# Patient Record
Sex: MALE | Race: WHITE | Employment: UNEMPLOYED | URBAN - METROPOLITAN AREA
[De-identification: names, ages, dates, MRNs, and addresses within clinical notes are randomized per-mention and may not be internally consistent; named-entity substitution may affect disease eponyms.]

---

## 2018-01-30 ENCOUNTER — APPOINTMENT (OUTPATIENT)
Dept: GENERAL RADIOLOGY | Age: 18
End: 2018-01-30
Attending: PHYSICIAN ASSISTANT
Payer: SELF-PAY

## 2018-01-30 ENCOUNTER — APPOINTMENT (OUTPATIENT)
Dept: CT IMAGING | Age: 18
End: 2018-01-30
Attending: EMERGENCY MEDICINE
Payer: SELF-PAY

## 2018-01-30 ENCOUNTER — HOSPITAL ENCOUNTER (OUTPATIENT)
Age: 18
Setting detail: OBSERVATION
Discharge: OTHER INSTITUTION | End: 2018-01-31
Attending: EMERGENCY MEDICINE | Admitting: INTERNAL MEDICINE
Payer: SELF-PAY

## 2018-01-30 DIAGNOSIS — S11.21XA TEAR OF ESOPHAGUS, INITIAL ENCOUNTER: ICD-10-CM

## 2018-01-30 DIAGNOSIS — J98.2 PNEUMOMEDIASTINUM (HCC): Primary | ICD-10-CM

## 2018-01-30 PROBLEM — F31.9 BIPOLAR 1 DISORDER (HCC): Status: ACTIVE | Noted: 2018-01-30

## 2018-01-30 PROBLEM — F95.2 TOURETTE'S DISEASE: Status: ACTIVE | Noted: 2018-01-30

## 2018-01-30 LAB
ALBUMIN SERPL-MCNC: 4.5 G/DL (ref 3.4–5)
ALBUMIN/GLOB SERPL: 1.3 {RATIO} (ref 0.8–1.7)
ALP SERPL-CCNC: 76 U/L (ref 45–117)
ALT SERPL-CCNC: 28 U/L (ref 16–61)
AMPHET UR QL SCN: POSITIVE
ANION GAP SERPL CALC-SCNC: 12 MMOL/L (ref 3–18)
AST SERPL-CCNC: 19 U/L (ref 15–37)
ATRIAL RATE: 87 BPM
BARBITURATES UR QL SCN: NEGATIVE
BASOPHILS # BLD: 0.1 K/UL (ref 0–0.06)
BASOPHILS NFR BLD: 1 % (ref 0–2)
BENZODIAZ UR QL: NEGATIVE
BILIRUB SERPL-MCNC: 1.5 MG/DL (ref 0.2–1)
BUN SERPL-MCNC: 20 MG/DL (ref 7–18)
BUN/CREAT SERPL: 19 (ref 12–20)
CALCIUM SERPL-MCNC: 9.2 MG/DL (ref 8.5–10.1)
CALCULATED P AXIS, ECG09: 66 DEGREES
CALCULATED R AXIS, ECG10: 92 DEGREES
CALCULATED T AXIS, ECG11: 65 DEGREES
CANNABINOIDS UR QL SCN: NEGATIVE
CHLORIDE SERPL-SCNC: 105 MMOL/L (ref 100–108)
CK MB CFR SERPL CALC: 0.6 % (ref 0–4)
CK MB CFR SERPL CALC: 0.6 % (ref 0–4)
CK MB SERPL-MCNC: 1.1 NG/ML (ref 5–25)
CK MB SERPL-MCNC: 1.5 NG/ML (ref 5–25)
CK SERPL-CCNC: 190 U/L (ref 39–308)
CK SERPL-CCNC: 246 U/L (ref 39–308)
CO2 SERPL-SCNC: 22 MMOL/L (ref 21–32)
COCAINE UR QL SCN: NEGATIVE
CREAT SERPL-MCNC: 1.06 MG/DL (ref 0.6–1.3)
DIAGNOSIS, 93000: NORMAL
DIFFERENTIAL METHOD BLD: ABNORMAL
EOSINOPHIL # BLD: 0.1 K/UL (ref 0–0.4)
EOSINOPHIL NFR BLD: 1 % (ref 0–5)
ERYTHROCYTE [DISTWIDTH] IN BLOOD BY AUTOMATED COUNT: 12.1 % (ref 11.6–14.5)
ETHANOL SERPL-MCNC: <3 MG/DL (ref 0–3)
GLOBULIN SER CALC-MCNC: 3.5 G/DL (ref 2–4)
GLUCOSE SERPL-MCNC: 77 MG/DL (ref 74–99)
HCT VFR BLD AUTO: 44.2 % (ref 36–48)
HDSCOM,HDSCOM: ABNORMAL
HGB BLD-MCNC: 16 G/DL (ref 13–16)
LYMPHOCYTES # BLD: 2.7 K/UL (ref 0.9–3.6)
LYMPHOCYTES NFR BLD: 32 % (ref 21–52)
MCH RBC QN AUTO: 30.8 PG (ref 24–34)
MCHC RBC AUTO-ENTMCNC: 36.7 G/DL (ref 31–37)
MCV RBC AUTO: 83.8 FL (ref 74–97)
METHADONE UR QL: NEGATIVE
MONOCYTES # BLD: 0.9 K/UL (ref 0.05–1.2)
MONOCYTES NFR BLD: 10 % (ref 3–10)
NEUTS SEG # BLD: 4.7 K/UL (ref 1.8–8)
NEUTS SEG NFR BLD: 56 % (ref 40–73)
OPIATES UR QL: NEGATIVE
P-R INTERVAL, ECG05: 126 MS
PCP UR QL: NEGATIVE
PLATELET # BLD AUTO: 297 K/UL (ref 135–420)
PMV BLD AUTO: 9.4 FL (ref 9.2–11.8)
POTASSIUM SERPL-SCNC: 3.6 MMOL/L (ref 3.5–5.5)
PROT SERPL-MCNC: 8 G/DL (ref 6.4–8.2)
Q-T INTERVAL, ECG07: 374 MS
QRS DURATION, ECG06: 96 MS
QTC CALCULATION (BEZET), ECG08: 450 MS
RBC # BLD AUTO: 5.2 M/UL (ref 4.7–5.5)
SODIUM SERPL-SCNC: 139 MMOL/L (ref 136–145)
TROPONIN I SERPL-MCNC: <0.02 NG/ML (ref 0–0.04)
TROPONIN I SERPL-MCNC: <0.02 NG/ML (ref 0–0.04)
VENTRICULAR RATE, ECG03: 87 BPM
WBC # BLD AUTO: 8.3 K/UL (ref 4.6–13.2)

## 2018-01-30 PROCEDURE — 74011250637 HC RX REV CODE- 250/637: Performed by: EMERGENCY MEDICINE

## 2018-01-30 PROCEDURE — 74011250636 HC RX REV CODE- 250/636: Performed by: EMERGENCY MEDICINE

## 2018-01-30 PROCEDURE — 93005 ELECTROCARDIOGRAM TRACING: CPT

## 2018-01-30 PROCEDURE — 99285 EMERGENCY DEPT VISIT HI MDM: CPT

## 2018-01-30 PROCEDURE — 96374 THER/PROPH/DIAG INJ IV PUSH: CPT

## 2018-01-30 PROCEDURE — 99218 HC RM OBSERVATION: CPT

## 2018-01-30 PROCEDURE — 80047 BASIC METABLC PNL IONIZED CA: CPT

## 2018-01-30 PROCEDURE — 80307 DRUG TEST PRSMV CHEM ANLYZR: CPT

## 2018-01-30 PROCEDURE — 74011636320 HC RX REV CODE- 636/320: Performed by: EMERGENCY MEDICINE

## 2018-01-30 PROCEDURE — 71046 X-RAY EXAM CHEST 2 VIEWS: CPT

## 2018-01-30 PROCEDURE — 96375 TX/PRO/DX INJ NEW DRUG ADDON: CPT

## 2018-01-30 PROCEDURE — 71250 CT THORAX DX C-: CPT

## 2018-01-30 PROCEDURE — 74011250636 HC RX REV CODE- 250/636: Performed by: PHYSICIAN ASSISTANT

## 2018-01-30 PROCEDURE — 80053 COMPREHEN METABOLIC PANEL: CPT

## 2018-01-30 PROCEDURE — 71275 CT ANGIOGRAPHY CHEST: CPT

## 2018-01-30 PROCEDURE — 82550 ASSAY OF CK (CPK): CPT

## 2018-01-30 PROCEDURE — 96361 HYDRATE IV INFUSION ADD-ON: CPT

## 2018-01-30 PROCEDURE — 85025 COMPLETE CBC W/AUTO DIFF WBC: CPT

## 2018-01-30 RX ORDER — KETOROLAC TROMETHAMINE 30 MG/ML
30 INJECTION, SOLUTION INTRAMUSCULAR; INTRAVENOUS
Status: COMPLETED | OUTPATIENT
Start: 2018-01-30 | End: 2018-01-30

## 2018-01-30 RX ORDER — ASPIRIN 325 MG
325 TABLET ORAL
Status: COMPLETED | OUTPATIENT
Start: 2018-01-30 | End: 2018-01-30

## 2018-01-30 RX ORDER — LORAZEPAM 2 MG/ML
0.5 INJECTION INTRAMUSCULAR
Status: COMPLETED | OUTPATIENT
Start: 2018-01-30 | End: 2018-01-30

## 2018-01-30 RX ADMIN — LORAZEPAM 0.5 MG: 2 INJECTION INTRAMUSCULAR; INTRAVENOUS at 15:18

## 2018-01-30 RX ADMIN — KETOROLAC TROMETHAMINE 30 MG: 30 INJECTION, SOLUTION INTRAMUSCULAR at 15:18

## 2018-01-30 RX ADMIN — SODIUM CHLORIDE 1000 ML: 900 INJECTION, SOLUTION INTRAVENOUS at 15:18

## 2018-01-30 RX ADMIN — IOPAMIDOL 80 ML: 755 INJECTION, SOLUTION INTRAVENOUS at 15:55

## 2018-01-30 RX ADMIN — DIATRIZOATE MEGLUMINE AND DIATRIZOATE SODIUM 30 ML: 660; 100 LIQUID ORAL; RECTAL at 18:46

## 2018-01-30 RX ADMIN — ASPIRIN 325 MG ORAL TABLET 325 MG: 325 PILL ORAL at 15:18

## 2018-01-30 NOTE — ED NOTES
Patient c/o withdrawal sx from methamphetamines  Patient's last usage was yesterday. Patient has been using for  one month.  Blood drawn and sent to the lab call bell within reach

## 2018-01-30 NOTE — ED NOTES
I performed a brief evaluation, including history and physical, of the patient here in triage and I have determined that pt will need further treatment and evaluation from the main side ER physician. I have placed initial orders to help in expediting patients care.      January 30, 2018 at 2:05 PM - ROCHELLE Gilliam-DEBI      +meth use, last time yesterday, now feeling hot, \"jittery\", with CP, SOB, palpitations

## 2018-01-30 NOTE — ED PROVIDER NOTES
EMERGENCY DEPARTMENT HISTORY AND PHYSICAL EXAM    2:26 PM      Date: 1/30/2018  Patient Name: Butch Aponte    History of Presenting Illness     Chief Complaint   Patient presents with    Drug Problem    Chest Pain    Shortness of Breath         History Provided By: Patient    Chief Complaint: CP  Duration:  1 Day  Timing:  Constant  Location: Central chest radiating to back  Quality: Pressure  Severity:   Modifying Factors: 0.2g meth smoked last night  Associated Symptoms: SOB, anxious, palpitations, lose of balance, confusion, tingling in fingers and arms. Denies abdominal pain. Additional History (Context): Butch Aponte is a 21 y.o. male with asthma and psychiatric disorders who presents with c/o constant central chest pain with pressure that radiates to back onset 1 day. Pt states he started smoking meth 1 month ago with 1-1.5g smoked this week with last use last night of 0.2g. Pt states sx began with SOB and then progressed to the current CP along with palpitations, anxiousness, loss of balance, confusion, and tingling in bilateral fingers and arms. Denies abdominal pain. Denies ETOH use for the last 4 months. Denies hx of blood clots or FHx. FHx of stroke and heart disease (mother in early/mid 35s). Friend at bedside. PCP: None        Past History     Past Medical History:  Past Medical History:   Diagnosis Date    Asthma     Psychiatric disorder     bipolar, terets, ocd, adhd, add       Past Surgical History:  History reviewed. No pertinent surgical history. Family History:  History reviewed. No pertinent family history. Social History:  Social History   Substance Use Topics    Smoking status: Former Smoker    Smokeless tobacco: Never Used    Alcohol use No       Allergies: Allergies   Allergen Reactions    Risperidone Other (comments)     seizures         Review of Systems       Review of Systems   Constitutional: Negative for chills and fever.    HENT: Negative for rhinorrhea. Respiratory: Positive for shortness of breath. Cardiovascular: Positive for chest pain (central radiating to back) and palpitations. Gastrointestinal: Positive for abdominal pain. Negative for nausea and vomiting. Endocrine: Negative for polyuria. Genitourinary: Negative for dysuria. Musculoskeletal: Negative for back pain. Skin: Negative for rash. Neurological: Positive for numbness (tingling in bilateral arms and hands). Negative for headaches. Loss of balance   Psychiatric/Behavioral: Positive for confusion. The patient is nervous/anxious. Physical Exam     Patient Vitals for the past 12 hrs:   Temp Pulse Resp BP SpO2   01/30/18 1530 - 86 16 121/59 100 %   01/30/18 1515 - - - 114/75 100 %   01/30/18 1500 - - - 127/79 -   01/30/18 1445 - 96 18 123/64 -   01/30/18 1402 97.6 °F (36.4 °C) (!) 136 20 (!) 138/97 97 %       Physical Exam   Constitutional: He is oriented to person, place, and time. He appears well-developed and well-nourished. Speaking in full sentences   HENT:   Head: Normocephalic and atraumatic. Eyes: Conjunctivae are normal. Pupils are equal, round, and reactive to light. Neck: Normal range of motion. Neck supple. Cardiovascular: Regular rhythm. Tachycardia present. Pulmonary/Chest: Effort normal and breath sounds normal. No respiratory distress. He has no wheezes. Abdominal: Soft. Bowel sounds are normal. He exhibits no distension. There is no tenderness. There is no rebound and no guarding. Musculoskeletal: Normal range of motion. Neurological: He is alert and oriented to person, place, and time. Skin: Skin is warm and dry. Psychiatric: Thought content normal. His mood appears anxious (mildly). Nursing note and vitals reviewed.         Diagnostic Study Results     Labs -  Recent Results (from the past 12 hour(s))   EKG, 12 LEAD, INITIAL    Collection Time: 01/30/18  2:22 PM   Result Value Ref Range    Ventricular Rate 87 BPM Atrial Rate 87 BPM    P-R Interval 126 ms    QRS Duration 96 ms    Q-T Interval 374 ms    QTC Calculation (Bezet) 450 ms    Calculated P Axis 66 degrees    Calculated R Axis 92 degrees    Calculated T Axis 65 degrees    Diagnosis       Sinus rhythm with marked sinus arrhythmia  Rightward axis  Borderline ECG  No previous ECGs available  Confirmed by Katelyn Oscar MD, Reyes Stapleton ([id number]) on 1/30/2018 3:36:01 PM     CBC WITH AUTOMATED DIFF    Collection Time: 01/30/18  2:32 PM   Result Value Ref Range    WBC 8.3 4.6 - 13.2 K/uL    RBC 5.20 4.70 - 5.50 M/uL    HGB 16.0 13.0 - 16.0 g/dL    HCT 44.2 36.0 - 48.0 %    MCV 83.8 74.0 - 97.0 FL    MCH 30.8 24.0 - 34.0 PG    MCHC 36.7 31.0 - 37.0 g/dL    RDW 12.1 11.6 - 14.5 %    PLATELET 051 715 - 379 K/uL    MPV 9.4 9.2 - 11.8 FL    NEUTROPHILS 56 40 - 73 %    LYMPHOCYTES 32 21 - 52 %    MONOCYTES 10 3 - 10 %    EOSINOPHILS 1 0 - 5 %    BASOPHILS 1 0 - 2 %    ABS. NEUTROPHILS 4.7 1.8 - 8.0 K/UL    ABS. LYMPHOCYTES 2.7 0.9 - 3.6 K/UL    ABS. MONOCYTES 0.9 0.05 - 1.2 K/UL    ABS. EOSINOPHILS 0.1 0.0 - 0.4 K/UL    ABS. BASOPHILS 0.1 (H) 0.0 - 0.06 K/UL    DF AUTOMATED     METABOLIC PANEL, COMPREHENSIVE    Collection Time: 01/30/18  2:32 PM   Result Value Ref Range    Sodium 139 136 - 145 mmol/L    Potassium 3.6 3.5 - 5.5 mmol/L    Chloride 105 100 - 108 mmol/L    CO2 22 21 - 32 mmol/L    Anion gap 12 3.0 - 18 mmol/L    Glucose 77 74 - 99 mg/dL    BUN 20 (H) 7.0 - 18 MG/DL    Creatinine 1.06 0.6 - 1.3 MG/DL    BUN/Creatinine ratio 19 12 - 20      GFR est AA >60 >60 ml/min/1.73m2    GFR est non-AA >60 >60 ml/min/1.73m2    Calcium 9.2 8.5 - 10.1 MG/DL    Bilirubin, total 1.5 (H) 0.2 - 1.0 MG/DL    ALT (SGPT) 28 16 - 61 U/L    AST (SGOT) 19 15 - 37 U/L    Alk.  phosphatase 76 45 - 117 U/L    Protein, total 8.0 6.4 - 8.2 g/dL    Albumin 4.5 3.4 - 5.0 g/dL    Globulin 3.5 2.0 - 4.0 g/dL    A-G Ratio 1.3 0.8 - 1.7     CARDIAC PANEL,(CK, CKMB & TROPONIN)    Collection Time: 01/30/18  2:32 PM Result Value Ref Range     39 - 308 U/L    CK - MB 1.5 <3.6 ng/ml    CK-MB Index 0.6 0.0 - 4.0 %    Troponin-I, Qt. <0.02 0.0 - 0.045 NG/ML   ETHYL ALCOHOL    Collection Time: 01/30/18  2:32 PM   Result Value Ref Range    ALCOHOL(ETHYL),SERUM <3 0 - 3 MG/DL   CARDIAC PANEL,(CK, CKMB & TROPONIN)    Collection Time: 01/30/18  5:17 PM   Result Value Ref Range     39 - 308 U/L    CK - MB 1.1 <3.6 ng/ml    CK-MB Index 0.6 0.0 - 4.0 %    Troponin-I, Qt. <0.02 0.0 - 0.045 NG/ML   EKG, 12 LEAD, SUBSEQUENT    Collection Time: 01/30/18  5:17 PM   Result Value Ref Range    Ventricular Rate 80 BPM    Atrial Rate 80 BPM    P-R Interval 138 ms    QRS Duration 94 ms    Q-T Interval 394 ms    QTC Calculation (Bezet) 454 ms    Calculated P Axis 52 degrees    Calculated R Axis 82 degrees    Calculated T Axis 65 degrees    Diagnosis       Normal sinus rhythm  Normal ECG  When compared with ECG of 30-JAN-2018 14:22,  No significant change was found         Radiologic Studies -   Xr Chest Pa Lat    Result Date: 1/30/2018  Chest  PAand lateral views INDICATION:  Chest pain/palpitations and shortness of breath after reported drug use yesterday. COMPARISON:  None FINDINGS:  The cardiac silhouette is normal in size. The pulmonary vasculature is unremarkable. No focal consolidation, pleural effusion, or pneumothorax. No acute osseous abnormalities are identified. IMPRESSION:  No radiographic evidence for acute cardiopulmonary process. .     CTA Chest:  Evidence of pneumomediastinum. There is a possible subtle defect in the medial  wall of the left mainstem bronchus which could potentially be a localizing  source. This region is seen on axial images 27, 28, 29. The esophagus is not  well assessed on this study and therefore esophageal findings as the etiology of  pneumomediastinum cannot be excluded. No evidence for pulmonary emboli. No acute aortic finding. No acute pulmonary process identified.   Upper abdomen and lower ribs are not well assessed due to motion artifact. Medical Decision Making   I am the first provider for this patient. I reviewed the vital signs, available nursing notes, past medical history, past surgical history, family history and social history. Vital Signs-Reviewed the patient's vital signs. Pulse Oximetry Analysis -  97% on room air (Interpretation) Normal    Cardiac Monitor:  Rate: 136 bpm  Rhythm:  Sinus Tachycardia     EKG: Interpreted by the EP. Time Interpreted: 14:22   Rate: 87   Rhythm: Normal Sinus Rhythm    Interpretation: No STEMI    Repeat EKG: Interpreted by the EP. Time Interpreted: 17:17   Rate: 80   Rhythm: Normal Sinus Rhythm    Interpretation: No STEMI    Records Reviewed: Nursing Notes (Time of Review: 2:32 PM)    Provider Notes (Medical Decision Making): Patient with pneumomediastinum, unclear if from airway or GI related. Given lack of fever or cough, pulmonologist does not want abx. Dr. Rico Aguirre requests GI consult. ED Course: Progress Notes, Reevaluation, and Consults:  5:05 PM: Reevaluated patient. Patient reports he feels much better after medications administered in the ED. Describes himself as somewhat anxious. Consult:  Discussed care with Dr. Ava Theodore, pulmonologist. Standard discussion; including history of patients chief complaint, available diagnostic results, and treatment course. Recommends admission to hospitalist, recommends observation, does not recommend abx.  5:38 PM, 1/30/2018     Consult:  Discussed care with Melody hospitalist. Standard discussion; including history of patients chief complaint, available diagnostic results, and treatment course. Accepts admit.   6:02 PM, 1/30/2018     Consult:  Discussed care with Dr. Hailey Townsend, GI. Standard discussion; including history of patients chief complaint, available diagnostic results, and treatment course.  Recommended gastrografin with non-contrast CT to assess for esophageal injury and will evaluate in the AM. CT ordered at this time. 6:11 PM, 1/30/2018     For Hospitalized Patients:    1. Hospitalization Decision Time:  The decision to hospitalize the patient was made by Dr. Talya King at 6:02 PM on 1/30/2018    2. Aspirin: Aspirin was not given because the patient did not present with a stroke at the time of their Emergency Department evaluation    Diagnosis     Clinical Impression:   1. Pneumomediastinum ([address]        Disposition: Admit. Follow-up Information     None           Patient's Medications    No medications on file     _______________________________    Attestations:  Clau Chauhan and Dominick Burgos acting as a scribe for and in the presence of Precious Ramirez MD      January 30, 2018 at 2:33 PM       Provider Attestation:      I personally performed the services described in the documentation, reviewed the documentation, as recorded by the scribe in my presence, and it accurately and completely records my words and actions.  January 30, 2018 at 2:33 PM - Precious Ramirez MD    _______________________________

## 2018-01-30 NOTE — ED TRIAGE NOTES
Pt c/o using meth yesterday now having CP with SOB, pt anxious , having tremors, pt aao*4, respirations even and unlabored, pt ambulatory strong steady gait , using drug for a month

## 2018-01-30 NOTE — Clinical Note
Patient Class[de-identified] Observation [321] Type of Bed: Telemetry [19] Reason for Observation: Obs Admitting Diagnosis: Pneumomediastinum ([facility] [address] [[id number]] Admitting Physician: Kemal Garcia [[id number]] Attending Physician: Kemal Garcia [[id number]]

## 2018-01-31 VITALS
HEART RATE: 95 BPM | BODY MASS INDEX: 18.42 KG/M2 | RESPIRATION RATE: 18 BRPM | OXYGEN SATURATION: 100 % | WEIGHT: 114.6 LBS | DIASTOLIC BLOOD PRESSURE: 69 MMHG | SYSTOLIC BLOOD PRESSURE: 119 MMHG | TEMPERATURE: 97.6 F | HEIGHT: 66 IN

## 2018-01-31 LAB
ANION GAP BLD CALC-SCNC: 18 MMOL/L (ref 10–20)
ATRIAL RATE: 80 BPM
BUN BLD-MCNC: 16 MG/DL (ref 7–18)
CA-I BLD-MCNC: 1.22 MMOL/L (ref 1.12–1.32)
CALCULATED P AXIS, ECG09: 52 DEGREES
CALCULATED R AXIS, ECG10: 82 DEGREES
CALCULATED T AXIS, ECG11: 65 DEGREES
CHLORIDE BLD-SCNC: 107 MMOL/L (ref 100–108)
CO2 BLD-SCNC: 20 MMOL/L (ref 19–24)
CREAT UR-MCNC: 0.9 MG/DL (ref 0.6–1.3)
DIAGNOSIS, 93000: NORMAL
GLUCOSE BLD STRIP.AUTO-MCNC: 78 MG/DL (ref 74–106)
HCT VFR BLD CALC: 41 % (ref 36–49)
HGB BLD-MCNC: 13.9 G/DL (ref 12–16)
P-R INTERVAL, ECG05: 138 MS
POTASSIUM BLD-SCNC: 3.9 MMOL/L (ref 3.5–5.5)
Q-T INTERVAL, ECG07: 394 MS
QRS DURATION, ECG06: 94 MS
QTC CALCULATION (BEZET), ECG08: 454 MS
SODIUM BLD-SCNC: 140 MMOL/L (ref 136–145)
VENTRICULAR RATE, ECG03: 80 BPM

## 2018-01-31 PROCEDURE — 99218 HC RM OBSERVATION: CPT

## 2018-01-31 NOTE — ED NOTES
Bedside shift change report given to Canelo Lyons 42  (oncoming nurse) by Kael Hoffman  (offgoing nurse). Report included the following information ED Summary, MAR and Recent Results.

## 2018-01-31 NOTE — ROUTINE PROCESS
Nguyễn Atwood RN  call, patient to go back to ED for Vascular consult, telem remains on and patient accompanied to ED via bed with tech and close friend. No distress noted or complaints voiced.

## 2018-01-31 NOTE — ED NOTES
Patient arrived back to room 10. Patient's vitals taken. Patient denies pain resting well call bell within reach, no additional wants or needs noted at this time.

## 2018-01-31 NOTE — ED NOTES
Patient currently complaining of auditory and visual hallucinations. States that he sees bugs crawling on the curtain and hearing conversations that other people are not hearing. Will review orders to see if there is any medications to help with any withdrawal symptoms at this time.

## 2018-01-31 NOTE — CONSULTS
Consult Note    Patient: Mariluz Aleman MRN: [id number]  CSN: [id number]    YOB: 2000  Age: 21 y.o. Sex: male    DOA: 1/30/2018 LOS:  LOS: 0 days        Requesting Physician: Kalpana Kwok MD    Reason for Consultation: Pneumomediastinum                 HPI:     Mariluz Aleman is a 21 y.o.  male who has been seen for chest pain and palpitations as well as worsening dyspnea over the last 4 days started using/ smoking crystal methamphetamine 1 month ago   Last used 12 hrs ago associated with worsening dyspnea and chest pain   In ER found to have Pneumomediastinum     Started with nausea and vomiting denies hem emesis or wretch ing blood       Past Medical History:   Diagnosis Date    Asthma     Psychiatric disorder     bipolar, terets, ocd, adhd, add       History reviewed. No pertinent surgical history. History reviewed. No pertinent family history. Social History     Social History    Marital status: SINGLE     Spouse name: N/A    Number of children: N/A    Years of education: N/A     Social History Main Topics    Smoking status: Former Smoker    Smokeless tobacco: Never Used    Alcohol use No    Drug use: Yes     Special: Methamphetamines    Sexual activity: Not Asked     Other Topics Concern    None     Social History Narrative    None       Prior to Admission medications    Not on File       Allergies   Allergen Reactions    Risperidone Other (comments)     seizures       Review of Systems  GENERAL: Patient alert, awake and oriented times 3, able to communicate full sentences and not in distress. HEENT: No change in vision, no earache, tinnitus, sore throat or sinus congestion. NECK: No pain or stiffness. CARDIOVASCULAR: + chest pain or pressure. No palpitations. PULMONARY:+shortness of breath, cough or wheeze. GASTROINTESTINAL: No abdominal pain, nausea, vomiting or diarrhea, melena or       bright red blood per rectum.    GENITOURINARY: No urinary frequency, urgency, hesitancy or dysuria. MUSCULOSKELETAL: No joint or muscle pain, no back pain, no recent trauma. DERMATOLOGIC: No rash, no itching, no lesions. ENDOCRINE: No polyuria, polydipsia, no heat or cold intolerance. No recent change in   weight. HEMATOLOGICAL: No anemia or easy bruising or bleeding. NEUROLOGIC: No headache, seizures, numbness, tingling or weakness. PSYCHIATRIC: No depression, anxiety, mood disorder, no loss of interest in normal       activity or change in sleep pattern. Physical Exam:      Visit Vitals    BP (!) 68/50    Pulse 86    Temp 97.6 °F (36.4 °C)    Resp 16    Ht 5' 6\" (1.676 m)    Wt 54.4 kg (120 lb)    SpO2 100%    BMI 19.37 kg/m2      O2 Device: Room air    Temp (24hrs), Av.6 °F (36.4 °C), Min:97.6 °F (36.4 °C), Max:97.6 °F (36.4 °C)              General:  Alert, cooperative, no acute distress    HEENT:  NC, Atraumatic. PERRLA, anicteric sclerae. Pulmonary:  CTA Bilaterally. No Wheezing/Rhonchi/Rales. Cardiovascular:  Regular  rhythm,  No murmur, No Rubs, No Gallops  GI:  Soft, Non distended, Non tender.  +Bowel sounds, no HSM  Extremities:  No edema, cyanosis, clubbing. No calf tenderness. Psych:  Good insight. Not anxious or agitated. Neurologic:  CN 2-12 grossly intact, Alert and oriented X 3. No acute neuro deficits.          Recent Results (from the past 24 hour(s))   EKG, 12 LEAD, INITIAL    Collection Time: 18  2:22 PM   Result Value Ref Range    Ventricular Rate 87 BPM    Atrial Rate 87 BPM    P-R Interval 126 ms    QRS Duration 96 ms    Q-T Interval 374 ms    QTC Calculation (Bezet) 450 ms    Calculated P Axis 66 degrees    Calculated R Axis 92 degrees    Calculated T Axis 65 degrees    Diagnosis       Sinus rhythm with marked sinus arrhythmia  Rightward axis  Borderline ECG  No previous ECGs available  Confirmed by Cale Meadows MD, [facility] ([id number]) on 2018 3:36:01 PM     CBC WITH AUTOMATED DIFF    Collection Time: 18 2:32 PM   Result Value Ref Range    WBC 8.3 4.6 - 13.2 K/uL    RBC 5.20 4.70 - 5.50 M/uL    HGB 16.0 13.0 - 16.0 g/dL    HCT 44.2 36.0 - 48.0 %    MCV 83.8 74.0 - 97.0 FL    MCH 30.8 24.0 - 34.0 PG    MCHC 36.7 31.0 - 37.0 g/dL    RDW 12.1 11.6 - 14.5 %    PLATELET 015 836 - 543 K/uL    MPV 9.4 9.2 - 11.8 FL    NEUTROPHILS 56 40 - 73 %    LYMPHOCYTES 32 21 - 52 %    MONOCYTES 10 3 - 10 %    EOSINOPHILS 1 0 - 5 %    BASOPHILS 1 0 - 2 %    ABS. NEUTROPHILS 4.7 1.8 - 8.0 K/UL    ABS. LYMPHOCYTES 2.7 0.9 - 3.6 K/UL    ABS. MONOCYTES 0.9 0.05 - 1.2 K/UL    ABS. EOSINOPHILS 0.1 0.0 - 0.4 K/UL    ABS. BASOPHILS 0.1 (H) 0.0 - 0.06 K/UL    DF AUTOMATED     METABOLIC PANEL, COMPREHENSIVE    Collection Time: 01/30/18  2:32 PM   Result Value Ref Range    Sodium 139 136 - 145 mmol/L    Potassium 3.6 3.5 - 5.5 mmol/L    Chloride 105 100 - 108 mmol/L    CO2 22 21 - 32 mmol/L    Anion gap 12 3.0 - 18 mmol/L    Glucose 77 74 - 99 mg/dL    BUN 20 (H) 7.0 - 18 MG/DL    Creatinine 1.06 0.6 - 1.3 MG/DL    BUN/Creatinine ratio 19 12 - 20      GFR est AA >60 >60 ml/min/1.73m2    GFR est non-AA >60 >60 ml/min/1.73m2    Calcium 9.2 8.5 - 10.1 MG/DL    Bilirubin, total 1.5 (H) 0.2 - 1.0 MG/DL    ALT (SGPT) 28 16 - 61 U/L    AST (SGOT) 19 15 - 37 U/L    Alk.  phosphatase 76 45 - 117 U/L    Protein, total 8.0 6.4 - 8.2 g/dL    Albumin 4.5 3.4 - 5.0 g/dL    Globulin 3.5 2.0 - 4.0 g/dL    A-G Ratio 1.3 0.8 - 1.7     CARDIAC PANEL,(CK, CKMB & TROPONIN)    Collection Time: 01/30/18  2:32 PM   Result Value Ref Range     39 - 308 U/L    CK - MB 1.5 <3.6 ng/ml    CK-MB Index 0.6 0.0 - 4.0 %    Troponin-I, Qt. <0.02 0.0 - 0.045 NG/ML   ETHYL ALCOHOL    Collection Time: 01/30/18  2:32 PM   Result Value Ref Range    ALCOHOL(ETHYL),SERUM <3 0 - 3 MG/DL   CARDIAC PANEL,(CK, CKMB & TROPONIN)    Collection Time: 01/30/18  5:17 PM   Result Value Ref Range     39 - 308 U/L    CK - MB 1.1 <3.6 ng/ml    CK-MB Index 0.6 0.0 - 4.0 %    Troponin-I, Qt. <0.02 0.0 - 0.045 NG/ML   EKG, 12 LEAD, SUBSEQUENT    Collection Time: 01/30/18  5:17 PM   Result Value Ref Range    Ventricular Rate 80 BPM    Atrial Rate 80 BPM    P-R Interval 138 ms    QRS Duration 94 ms    Q-T Interval 394 ms    QTC Calculation (Bezet) 454 ms    Calculated P Axis 52 degrees    Calculated R Axis 82 degrees    Calculated T Axis 65 degrees    Diagnosis       Normal sinus rhythm  Normal ECG  When compared with ECG of 30-JAN-2018 14:22,  No significant change was found     DRUG SCREEN, URINE    Collection Time: 01/30/18  8:43 PM   Result Value Ref Range    BENZODIAZEPINES NEGATIVE  NEG      BARBITURATES NEGATIVE  NEG      THC (TH-CANNABINOL) NEGATIVE  NEG      OPIATES NEGATIVE  NEG      PCP(PHENCYCLIDINE) NEGATIVE  NEG      COCAINE NEGATIVE  NEG      AMPHETAMINES POSITIVE (A) NEG      METHADONE NEGATIVE  NEG      HDSCOM (NOTE)        Labs Reviewed: All lab results for the last 24 hours reviewed. Procedures/imaging: see electronic medical records for all procedures/Xrays and details which were not copied into this note but were reviewed prior to creation of Plan  CT Results  (Last 48 hours)               01/30/18 1845  CT CHEST WO CONT Final result    Impression:  IMPRESSION:        1. Mild pneumomediastinum again noted. Air contrast level noted within mildly   distended esophagus. Two subtle linear contrast filled areas noted, one is   anterior to the left lateral wall of the distal esophagus and the second tiny   area is between the distal trachea and right lateral proximal esophagus above   the levy as outlined above. These findings are concerning for contrast   extravasation, probably from distal esophageal laceration] and tracking   superiorly, likely mild in degree. No inflammation or other apparent   complication in mediastinum. 2. No additional significant chest finding seen.        A wet read was provided to the ordering physician, Dr. Renato Murray, by me upon the   interpretation at approximately  2004  hours. Thank you for your referral.        Narrative:  CT chest without contrast        HISTORY: Pneumomediastinum. Evaluation of esophagus       COMPARISON: Earlier today at 1555 hours       TECHNIQUE: Helical scans through the chest is obtained from the thoracic inlet   to the diaphragm without IV contrast administration. Oral contrast administered   prior to scan. All CT scans at this facility performed using dose optimization techniques as   appreciated to a performed exam, to include automated exposure control,   adjustment of the mA and or KU according to patient size (including appropriate   matching for site specific examination), or use of iterative reconstruction   technique. FINDINGS: The study is suboptimal due to lack of IV contrast.  Subtle   abnormality can be under detected. Lung Parenchyma: Clear. No pneumothorax, pulmonary consolidation or infiltration   identified. Thyroid: Unremarkable. Mediastinum: Mild pneumomediastinum again noted mainly anterior to the esophagus   and the tracking in the anterior levy. The esophagus is partially opacified with oral contrast with air-fluid level. There is mild to moderate dilatation of the esophagus demonstrated with   associated streak artifact. There is linear contrast-filled area identified at   inferior mediastinum just anteriorly and left laterally abutting the distal   esophageal wall, poorly seen in detail due to streak artifact from dense   intraluminal contrast from the distal esophagus. It shows on axial #69 - #77,   sagittal #24-33 and coronal #31-32. The second linear contrast extravasation   also suspected in the right superior mediastinum between the distal trachea and   anterior to the right lateral aspect of esophageal wall on axial #47 and   sagittal #36 and coronal #39.  The findings are suspicious for contrast   extravasation, more likely from anterior distal esophageal wall with a contrast   tracking superiorly. No inflammation or other apparent complication noted in the   mediastinum. No additional mediastinal abnormality. Pleural Space And Chest Wall: No significant pleural pathology. Heart: The heart and the pericardium appear normal.       Vasculature: The aorta and the great vessels are unremarkable. Imaged Upper Abdomen: Unremarkable. Osseous Structures: Unremarkable for age. 01/30/18 1602  CTA 114[facility] Final result    Impression:  IMPRESSION:   Evidence of pneumomediastinum. There is a possible subtle defect in the medial   wall of the left mainstem bronchus which could potentially be a localizing   source. This region is seen on axial images 27, 28, 29. The esophagus is not   well assessed on this study and therefore esophageal findings as the etiology of   pneumomediastinum cannot be excluded. No evidence for pulmonary emboli. No acute aortic finding. No acute pulmonary process identified. Upper abdomen and lower ribs are not well assessed due to motion artifact. Narrative:  CTA CHEST PULMONARY EMBOLISM PROTOCOL             INDICATION: Acute chest pain. Shortness of breath. Difficulty breathing. Question pulmonary embolism. TECHNIQUE: Thin collimation axial images obtained through the level of the   pulmonary arteries with additional imaging through the chest following the   uneventful administration of 80 cc Isovue-370 nonionic intravenous contrast.    Images reconstructed into three dimensional coronal and sagittal projections for   complete evaluation of the tortuous and overlapping pulmonary vascular   structures and to reduce patient radiation dose. Additional, 3-D reconstructions   of the thoracic aorta and major branch vessels were also obtained.        All CT scans at this facility are performed using dose optimization technique as   appropriate to a performed exam, to include automated exposure control,   adjustment of the mA and/or kV according to patient size (including appropriate   matching first site-specific examinations), or use of iterative reconstruction   technique. COMPARISON: Same day chest radiograph. FINDINGS:       No filling defects are appreciated within the main, left, right, lobar or   visualized segmental pulmonary arteries to suggest embolism. Thoracic aorta is   normal in caliber. There is no evidence of aortic dissection or other acute   aortic pathology. Thyroid: Unremarkable in its visualized aspects. Pericardium/ Heart: Heart is normal in size. There is no significant pericardial   effusion. Aorta/ Vessels: No aneurysm or dissection. Lymph Nodes: Unremarkable. .   Mediastinum: There is pneumomediastinum. Slightly ill-defined medial wall of the   left mainstem bronchus noted. Esophagus: Not well assessed. Lungs: Lungs are grossly clear. No focal consolidation, pleural effusion,   pulmonary edema, or pneumothorax. Upper Abdomen: Obscured due to motion. No definite acute finding is evident. Possible slight extrinsic compression of the celiac artery. Bones/soft tissues: No acute finding. Respiratory motion obscures evaluation of   the lower ribs. Assessment/Plan     Principal Problem:    Esophageal tear (1/30/2018)    Active Problems:    Pneumomediastinum ([address] (1/30/2018)      Bipolar 1 disorder ([address] (1/30/2018)      Tourette's disease (1/30/2018)      Plan :  Spoke with GI regarding tear who recommends consult to CT surgery in Case repair is needed  Spoke with Dr. Phyllis Perkins surgeon on Call   recommends transfer to [facility] due to not performing Esophageal surgery  Spoke with Dr. Nicolette Pinon  doctor on call   Explained we have no coveration for esophageal tear    Needs PPI  Zosyn  CT surgery and GI consults on Transfer     Danielle Hernandez MD  1/30/2018 9:13 PM    Please call me if questions [phone].

## 2018-01-31 NOTE — ROUTINE PROCESS
Telephone report received from Trey Barney RN, reviewed patient's SBAR, MAR, VS and labs. Room 207 ready.

## 2018-01-31 NOTE — ROUTINE PROCESS
TRANSFER - IN REPORT:    Verbal report received from DOLORES Stovall (name) on Amira Galvin  being received from ED (unit) for routine progression of care      Report consisted of patients Situation, Background, Assessment and   Recommendations(SBAR). Information from the following report(s) SBAR, Kardex, ED Summary, MAR, Recent Results and Cardiac Rhythm  ST/ SR was reviewed with the receiving nurse. Opportunity for questions and clarification was provided. Assessment completed upon patients arrival to unit and care assumed.

## 2018-01-31 NOTE — ED NOTES
Patient is upset about the conversation had with Dr. Maci Ying states that he would like to leave and desires to have the IV removed. Patient agrees to speak to charge nurse about the situation. Charge nurse called to bedside. After speaking with charge nurse Harini BERNAL patient agrees to stay. Per Charge nurse it is requested to give patient and his friend at the bedside a healthy choice meal with ginger ale. Meal was provided. Patient was adjusted to his comfort no additional wants or needs noted.

## 2018-01-31 NOTE — PROGRESS NOTES
Problem: Falls - Risk of  Goal: *Absence of Falls  Document Venu Fall Risk and appropriate interventions in the flowsheet.    Outcome: Progressing Towards Goal  Fall Risk Interventions:            Medication Interventions: Teach patient to arise slowly

## 2018-01-31 NOTE — ED NOTES
11:11 PM  Reevaluation:  Patient states that he has had CP radiating to both arms with numbness and tingling x 1 week, worse this morning with a near-syncopal episode. He notes that his last vomiting episode was 2 weeks ago. States that he began smoking meth 1 month ago. He denies fever, chills or any other symptoms. On physical exam he has normal lung sounds, soft abdomen and normal heart sounds with no murmur, rubs or gallop. Consult:  Discussed care with Dr. Kael Shah, Thoracic Surgery at [facility]. Standard discussion; including history of patients chief complaint, available diagnostic results, and treatment course. Agrees to accept patient for transfer. 12:14 AM, 1/30/2018       Scribe Attestation     Sera Taylor acting as a scribe for and in the presence of Feng Erickson MD      January 30, 2018 at 11:10 PM       Provider Attestation:      I personally performed the services described in the documentation, reviewed the documentation, as recorded by the scribe in my presence, and it accurately and completely records my words and actions.  January 30, 2018 at 11:10 PM - Feng Erickson MD

## 2018-01-31 NOTE — ED NOTES
TRANSFER - OUT REPORT:    Verbal report given to  Doc Alexander on Cherelle Lozano  being transferred to [facility] [place] for routine progression of care       Report consisted of patients Situation, Background, Assessment and   Recommendations(SBAR). Information from the following report(s) ED Summary and MAR was reviewed with the receiving nurse. Lines:   Peripheral IV 01/30/18 Left Antecubital (Active)        Opportunity for questions and clarification was provided.       Patient transported with:   transport